# Patient Record
Sex: MALE | Race: BLACK OR AFRICAN AMERICAN | NOT HISPANIC OR LATINO | URBAN - METROPOLITAN AREA
[De-identification: names, ages, dates, MRNs, and addresses within clinical notes are randomized per-mention and may not be internally consistent; named-entity substitution may affect disease eponyms.]

---

## 2020-06-09 ENCOUNTER — OFFICE VISIT (OUTPATIENT)
Dept: URBAN - METROPOLITAN AREA TELEHEALTH 2 | Facility: TELEHEALTH | Age: 55
End: 2020-06-09
Payer: COMMERCIAL

## 2020-06-09 DIAGNOSIS — Z12.11 SCREENING COLONOSCOPY: ICD-10-CM

## 2020-06-09 PROCEDURE — 99202 OFFICE O/P NEW SF 15 MIN: CPT | Performed by: INTERNAL MEDICINE

## 2020-06-09 RX ORDER — AMLODIPINE BESYLATE 10 MG/1
1 TABLET TABLET ORAL ONCE A DAY
Qty: 30 | Status: ACTIVE | COMMUNITY
Start: 2020-06-09

## 2020-06-09 RX ORDER — POLYETHYLENE GLYCOL 3350 17 G/17G
AS DIRECTED POWDER, FOR SOLUTION ORAL ONCE A DAY
Qty: 238 GRAM | Refills: 0 | OUTPATIENT
Start: 2020-06-09

## 2020-06-09 RX ORDER — ATORVASTATIN CALCIUM 40 MG/1
1 TABLET TABLET, FILM COATED ORAL ONCE A DAY
Qty: 30 | Status: ACTIVE | COMMUNITY
Start: 2020-06-09

## 2020-06-09 RX ORDER — HYDROCHLOROTHIAZIDE 25 MG/1
1 TABLET IN THE MORNING TABLET ORAL ONCE A DAY
Qty: 30 | Status: ACTIVE | COMMUNITY
Start: 2020-06-09

## 2020-06-09 NOTE — HPI-TODAY'S VISIT:
55 yo patient presenting for evaluation for screening colon. He denies acute or chronic cardiopulmonary sxs. Denies significant medical hx. No fmhx of CRC but mother recently dx'd with gastric cancer. Attempts were made to use real-time two-way video technology for today's encounter. Due to a technological failure or access issues, telephone technology was used in lieu of an office visit due to the current COVID-19 pandemic crisis and need for social isolation.

## 2020-06-12 ENCOUNTER — OFFICE VISIT (OUTPATIENT)
Dept: URBAN - METROPOLITAN AREA CLINIC 15 | Facility: CLINIC | Age: 55
End: 2020-06-12

## 2021-01-11 ENCOUNTER — HOSPITAL ENCOUNTER (EMERGENCY)
Dept: HOSPITAL 5 - ED | Age: 56
Discharge: HOME | End: 2021-01-11
Payer: COMMERCIAL

## 2021-01-11 VITALS — SYSTOLIC BLOOD PRESSURE: 155 MMHG | DIASTOLIC BLOOD PRESSURE: 82 MMHG

## 2021-01-11 DIAGNOSIS — Z79.899: ICD-10-CM

## 2021-01-11 DIAGNOSIS — M19.012: Primary | ICD-10-CM

## 2021-01-11 PROCEDURE — 99283 EMERGENCY DEPT VISIT LOW MDM: CPT

## 2021-01-11 NOTE — XRAY REPORT
LEFT SHOULDER 3 VIEWS



INDICATION:  Left shoulder injury not able to lift. 



COMPARISON: None.



IMPRESSION:  No acute osseous or soft tissue abnormality.    Mild osteoarthritic changes are identifi
ed at the acromioclavicular joint and AC joint. There is also suggestion of a chronic healed fracture
 of the distal left clavicle.

 



Signer Name: Long Hart Jr, MD 

Signed: 1/11/2021 1:49 PM

Workstation Name: [id number]

## 2021-01-11 NOTE — EMERGENCY DEPARTMENT REPORT
Upper Extremity





- [facility]


Chief Complaint: Shoulder Injury


Stated Complaint: SHOULDER PAIN


Time Seen by Provider: 21 13:27


Upper Extremity: Left Shoulder


Occurred When: >5 Days


Symptoms: Yes Pain with Movement, Yes Limited Range of Movement, No Deformity, 

No Swelling, No Bruising/Ecchymosis


Other History: 55-year-old -American male with a past medical history of 

hypertension currently on hydrochlorothiazide and amlodipine presents to the 

emergency room complaining of left shoulder pain after lifting a heavy object 

last week.  Patient states that he has taken Advil last dose was last night.  

Patient states that it is more pain when he tries to elevate his left arm.





ED Review of Systems


ROS: 


Stated complaint: SHOULDER PAIN


Other details as noted in HPI








ED Past Medical Hx





- Past Medical History


Previous Medical History?: No





- Surgical History


Past Surgical History?: No





- Medications


Home Medications: 


                                Home Medications











 Medication  Instructions  Recorded  Confirmed  Last Taken  Type


 


Diclofenac Sodium 50 mg PO Q8H PRN #30 tablet. 21  Unknown Rx














Upper Extremity Exam





- Exam


General: 


Vital signs noted. No distress. Alert and acting appropriately.





Head and Torso: No HEENT Abnormality, No Neck Tenderness, No Chest/Lungs 

Abnormality, No Abdominal Tenderness, No Back Tenderness


Shoulder Exam: Yes Shoulder Tenderness, Yes AC Joint Tenderness, No Clavicle 

Tenderness, No Normal Range of Motion in Shoulder, No Shoulder Deformity


Arm Exam: No Arm/Humerus Tenderness, No Arm Deformity


Elbow: No Elbow Tenderness, No Normal Range of Motion in Elbow, No Elbow 

Deformity


Forearm: No Forearm Tenderness, No Forearm Deformity, No Pain with Pronation, No

Pain with Supination


Wrist: Yes Normal ROM in Wrist, No Wrist Tenderness, No Wrist Deformity, No 

Snuffbox Tenderness, No Pain with Axial Thumb Compression


Hand: Yes Normal ROM in Digit(s), No Hand Tenderness, No Hand Deformity, No 

Digit Tenderness, No Digit(s) Deformity, No Tendon Dysfunction


CMS Exam: No Broken Skin, No Normal Distal Pulses, No Normal Capillary Refill, 

No Normal Distal Sensation





ED Course


                                   Vital Signs











  21





  13:28


 


Temperature 98.2 F


 


Pulse Rate 68


 


Respiratory 16





Rate 


 


Blood Pressure 155/82





[Right] 


 


O2 Sat by Pulse 98





Oximetry 














ED Medical Decision Making





- Radiology Data


Radiology results: report reviewed





Patient: LUCY BAER MR#: [id number] 


 34 


 : 1965 Acct:[id number] 





 Age/Sex: 55 / M ADM Date: 21 





 Loc: ED 


 Attending Dr: 








 Ordering Physician: ANNY MARS 


 Date of Service: 21 


 Procedure(s): XR shoulder 2+V LT 


 Accession Number(s): [id number] 





 cc: ANNY MARS 





 Fluoro Time In Minutes: 





 LEFT SHOULDER 3 VIEWS 





INDICATION: Left shoulder injury not able to lift. 





COMPARISON: None. 





IMPRESSION: No acute osseous or soft tissue abnormality. Mild osteoarthritic 

changes are 


 identified at the acromioclavicular joint and AC joint. There is also 

suggestion of a chronic healed


 fracture of the distal left clavicle. 








Signer Name: Long Hart Jr, MD 


Signed: 2021 1:49 PM 


Workstation Name: [id number] 








 Transcribed By: TTR 


 Dictated By: LONG HART JR, MD 


 Electronically Authenticated By: LONG HART JR, MD 


 Signed Date/Time: 21 134 











 DD/DT: 218 


 TD/TT: 





- Medical Decision Making





55-year-old -American male with a past medical history of hypertension 

currently on hydrochlorothiazide and amlodipine presents to the emergency room 

complaining of left shoulder pain after lifting a heavy object last week.  

Patient states that he has taken Advil last dose was last night.  Patient states

 that it is more pain when he tries to elevate his left arm.








X-ray of left shoulder has been ordered.


Critical care attestation.: 


If time is entered above; I have spent that time in minutes in the direct care 

of this critically ill patient, excluding procedure time.








ED Disposition


Clinical Impression: 


 Left shoulder pain, Arthritis of shoulder region, degenerative





Disposition: DC-01 TO HOME OR SELFCARE


Is pt being admited?: No


Does the pt Need Aspirin: No


Condition: Stable


Instructions:  Shoulder Pain, Easy-to-Read, Arthritis, Easy-to-Read


Additional Instructions: 


X-ray is negative for any acute fractures.  It does show that you have some 

arthritis.  As well as a old clavicle fracture that is healed.  I recommended 

she follow-up with an orthopedic provider as she may need further studies.  You 

can take the pain medication be sure to increase your water intake while taking 

pain medication.


Prescriptions: 


Diclofenac Sodium 50 mg PO Q8H PRN #30 tablet.dr


 PRN Reason: Pain , Severe (7-10)


Referrals: 


LACEY GUTIERREZ MD [Staff Physician] - 3-5 Days


Forms:  Work/School Release Form(ED)

## 2022-07-21 ENCOUNTER — HOSPITAL ENCOUNTER (EMERGENCY)
Dept: HOSPITAL 5 - ED | Age: 57
Discharge: HOME | End: 2022-07-21
Payer: COMMERCIAL

## 2022-07-21 VITALS — SYSTOLIC BLOOD PRESSURE: 130 MMHG | DIASTOLIC BLOOD PRESSURE: 89 MMHG

## 2022-07-21 DIAGNOSIS — R06.02: Primary | ICD-10-CM

## 2022-07-21 DIAGNOSIS — F17.200: ICD-10-CM

## 2022-07-21 DIAGNOSIS — F12.90: ICD-10-CM

## 2022-07-21 DIAGNOSIS — I10: ICD-10-CM

## 2022-07-21 DIAGNOSIS — R07.9: ICD-10-CM

## 2022-07-21 PROCEDURE — 71046 X-RAY EXAM CHEST 2 VIEWS: CPT

## 2022-07-21 PROCEDURE — 99283 EMERGENCY DEPT VISIT LOW MDM: CPT

## 2022-07-21 NOTE — EMERGENCY DEPARTMENT REPORT
ED Shortness of Breath HPI





- General


Chief Complaint: Dyspnea/Respdistress


Stated Complaint: YVETTE


Time Seen by Provider: 07/21/22 07:28


Source: patient


Mode of arrival: Ambulatory


Limitations: No Limitations





- History of Present Illness


Initial Comments: 





56-year-old black male with a past medical history of hypertension presents to 

the emergency department for evaluation of sudden onset of shortness of breath. 

He states that when he woke up this morning he felt like he could not take a 

deep breath.  He states that whenever he would breathe he had pain in his lower 

back and it felt like a cramp.  He states that he also had pain in his abdominal

area that felt like bloating.  He states that pain is worse with movement and 

deep breaths.  He denies chest pain, fever, cough, nausea, vomiting, dizziness, 

and diaphoresis.


MD Complaint: shortness of breath


-: Sudden, hour(s)


Severity: moderate


Pain Scale: 7


Quality: aching, other (Cramping)


Consistency: intermittent


Worsens With: movement, coughing, inspiration


Associated Symptoms: pain with inspiration


Treatments Prior to Arrival: none





- Related Data


Home Oxygen Therapy: No


                                  Previous Rx's











 Medication  Instructions  Recorded  Last Taken  Type


 


Diclofenac Sodium 50 mg PO Q8H PRN #30 tablet. 01/11/21 Unknown Rx


 


Cyclobenzaprine [Flexeril] 10 mg PO TID PRN #30 tab 07/21/22 Unknown Rx


 


Naproxen [Naprosyn] 500 mg PO BID PRN #14 tab 07/21/22 Unknown Rx











                                    Allergies











Allergy/AdvReac Type Severity Reaction Status Date / Time


 


No Known Allergies Allergy   Unverified 01/11/21 13:25














ED Review of Systems


ROS: 


Stated complaint: YVETTE


Other details as noted in HPI





Comment: All other systems reviewed and negative


Constitutional: denies: chills, fever, weakness


Eyes: denies: vision change


ENT: denies: congestion


Respiratory: shortness of breath.  denies: cough, SOB with exertion, SOB at 

rest, stridor, wheezing


Cardiovascular: denies: chest pain, palpitations, dyspnea on exertion, 

orthopnea, edema, syncope, paroxysmal nocturnal dyspnea


Gastrointestinal: denies: abdominal pain, nausea, vomiting, diarrhea, 

hematemesis, melena, hematochezia


Genitourinary: denies: urgency, dysuria


Musculoskeletal: back pain


Neurological: denies: headache, weakness





ED Past Medical Hx





- Past Medical History


Previous Medical History?: Yes


Hx Hypertension: Yes





- Surgical History


Past Surgical History?: No





- Social History


Smoking Status: Current Every Day Smoker


Substance Use Type: Marijuana





- Medications


Home Medications: 


                                Home Medications











 Medication  Instructions  Recorded  Confirmed  Last Taken  Type


 


Diclofenac Sodium 50 mg PO Q8H PRN #30 tablet. 01/11/21  Unknown Rx


 


Cyclobenzaprine [Flexeril] 10 mg PO TID PRN #30 tab 07/21/22  Unknown Rx


 


Naproxen [Naprosyn] 500 mg PO BID PRN #14 tab 07/21/22  Unknown Rx














ED Physical Exam





- General


Limitations: No Limitations


General appearance: alert, in no apparent distress





- Head


Head exam: Present: atraumatic, normocephalic





- Eye


Eye exam: Present: normal appearance.  Absent: scleral icterus, conjunctival 

injection, periorbital swelling, periorbital tenderness





- Neck


Neck exam: Present: normal inspection, full ROM.  Absent: tenderness, 

lymphadenopathy





- Respiratory


Respiratory exam: Present: normal lung sounds bilaterally, chest wall tendernes

s.  Absent: respiratory distress, wheezes, rales, rhonchi, stridor





- Cardiovascular


Cardiovascular Exam: Present: regular rate, normal heart sounds





- GI/Abdominal


GI/Abdominal exam: Present: soft, normal bowel sounds.  Absent: distended, 

tenderness, guarding, rebound, rigid





- Extremities Exam


Extremities exam: Present: normal inspection, normal capillary refill.  Absent: 

pedal edema, joint swelling, calf tenderness





- Back Exam


Back exam: Present: normal inspection, tenderness (Bilateral lower).  Absent: 

CVA tenderness (R), CVA tenderness (L), vertebral tenderness





- Neurological Exam


Neurological exam: Present: alert, oriented X3, CN II-XII intact, normal gait





- Psychiatric


Psychiatric exam: Present: normal affect, normal mood





- Skin


Skin exam: Present: warm, dry, intact, normal color





ED Course


                                   Vital Signs











  07/21/22 07/21/22





  05:39 08:20


 


Temperature 98.2 F 98.0 F


 


Pulse Rate 74 66


 


Respiratory 20 20





Rate  


 


Blood Pressure 142/91 


 


Blood Pressure  130/89





[Left]  


 


O2 Sat by Pulse 96 96





Oximetry  














ED Medical Decision Making





- Radiology Data


Radiology results: report reviewed, image reviewed





Chest x-ray:


 FINDINGS:  


 


 SUPPORT DEVICES: None.  


 HEART / MEDIASTINUM: No significant abnormality.   


 LUNGS / PLEURA: No significant pulmonary or pleural abnormality. No 

pneumothorax.   


 


 ADDITIONAL FINDINGS: No significant additional findings.  


 


 IMPRESSION:  


 1. No acute findings. 





- Medical Decision Making





56-year-old black male with a past medical history of hypertension presents to 

the emergency department for evaluation of sudden onset of shortness of breath. 

 He states that when he woke up this morning he felt like he could not take a 

deep breath.  He states that whenever he would breathe he had pain in his lower 

back and it felt like a cramp.  He states that he also had pain in his abdominal

 area that felt like bloating.  He states that pain is worse with movement and 

deep breaths.  He denies chest pain, fever, cough, nausea, vomiting, dizziness, 

and diaphoresis.





Physical exam notable for chest wall tenderness and tenderness to lower back 

with palpation.  Chest x-ray without any acute abnormalities noted.  Patient 

will be discharged home with 7-day course of naproxen along with Flexeril and 

advised to follow-up with his primary care provider if no improvement or 

worsening symptoms.  He is advised to return to the emergency department for any

 concerning symptoms.  He verbalizes understanding of and agreement with plan of

 care.


Critical care attestation.: 


If time is entered above; I have spent that time in minutes in the direct care 

of this critically ill patient, excluding procedure time.








ED Disposition


Clinical Impression: 


 Shortness of breath, Chest wall tenderness





Lower back pain


Qualifiers:


 Chronicity: acute Back pain laterality: bilateral Sciatica presence: without 

sciatica Qualified Code(s): M54.50 - Low back pain, unspecified





Disposition: 01 HOME / SELF CARE / HOMELESS


Is pt being admited?: No


Does the pt Need Aspirin: No


Condition: Stable


Instructions:  Shortness of Breath, Adult, Easy-to-Read, Chest Wall Pain, 

Easy-to-Read, Low Back Sprain or Strain Rehab-SportsMed


Additional Instructions: 


Take medications as prescribed.  Follow-up with your primary care provider if no

 improvement or worsening symptoms.  Return to the emergency department for any 

concerning symptoms.


Prescriptions: 


Cyclobenzaprine [Flexeril] 10 mg PO TID PRN #30 tab


 PRN Reason: Muscle Spasm


Naproxen [Naprosyn] 500 mg PO BID PRN #14 tab


 PRN Reason: pain


Referrals: 


SORAYA DIAZ MD [Staff Physician] - 3-5 Days


Forms:  Work/School Release Form(ED)


Time of Disposition: 09:05

## 2022-07-21 NOTE — XRAY REPORT
CHEST 2 VIEWS 



INDICATION / CLINICAL INFORMATION: sob.



COMPARISON: None available



FINDINGS:



SUPPORT DEVICES: None.

HEART / MEDIASTINUM: No significant abnormality. 

LUNGS / PLEURA: No significant pulmonary or pleural abnormality. No pneumothorax. 



ADDITIONAL FINDINGS: No significant additional findings.



IMPRESSION:

1. No acute findings.



Signer Name: Bubba Rogers DO 

Signed: 7/21/2022 8:28 AM

Workstation Name: [id number]

## 2023-05-22 ENCOUNTER — LAB OUTSIDE AN ENCOUNTER (OUTPATIENT)
Dept: URBAN - METROPOLITAN AREA CLINIC 118 | Facility: CLINIC | Age: 58
End: 2023-05-22

## 2023-05-22 ENCOUNTER — CLAIMS CREATED FROM THE CLAIM WINDOW (OUTPATIENT)
Dept: URBAN - METROPOLITAN AREA CLINIC 118 | Facility: CLINIC | Age: 58
End: 2023-05-22
Payer: COMMERCIAL

## 2023-05-22 ENCOUNTER — DASHBOARD ENCOUNTERS (OUTPATIENT)
Age: 58
End: 2023-05-22

## 2023-05-22 VITALS
HEIGHT: 71 IN | BODY MASS INDEX: 24.92 KG/M2 | TEMPERATURE: 97.8 F | WEIGHT: 178 LBS | DIASTOLIC BLOOD PRESSURE: 84 MMHG | SYSTOLIC BLOOD PRESSURE: 142 MMHG | HEART RATE: 57 BPM

## 2023-05-22 DIAGNOSIS — Z12.11 SCREENING FOR COLON CANCER: ICD-10-CM

## 2023-05-22 PROCEDURE — 99202 OFFICE O/P NEW SF 15 MIN: CPT

## 2023-05-22 PROCEDURE — 99202 OFFICE O/P NEW SF 15 MIN: CPT | Performed by: INTERNAL MEDICINE

## 2023-05-22 PROCEDURE — 99212 OFFICE O/P EST SF 10 MIN: CPT | Performed by: INTERNAL MEDICINE

## 2023-05-22 RX ORDER — AMLODIPINE BESYLATE 10 MG/1
1 TABLET TABLET ORAL ONCE A DAY
Qty: 30 | Status: ACTIVE | COMMUNITY
Start: 2020-06-09

## 2023-05-22 RX ORDER — POLYETHYLENE GLYCOL 3350 17 G/17G
AS DIRECTED POWDER, FOR SOLUTION ORAL ONCE A DAY
Qty: 238 GRAM | Refills: 0 | Status: ON HOLD | COMMUNITY
Start: 2020-06-09

## 2023-05-22 RX ORDER — HYDROCHLOROTHIAZIDE 25 MG/1
1 TABLET IN THE MORNING TABLET ORAL ONCE A DAY
Qty: 30 | Status: ACTIVE | COMMUNITY
Start: 2020-06-09

## 2023-05-22 RX ORDER — ATORVASTATIN CALCIUM 40 MG/1
1 TABLET TABLET, FILM COATED ORAL ONCE A DAY
Qty: 30 | Status: ACTIVE | COMMUNITY
Start: 2020-06-09

## 2023-05-22 NOTE — HPI-TODAY'S VISIT:
Mr. Shaw is a 56 y/o AAM who presents today for colonoscopy consultation. Patient was referred by Dr. Harpal Rollins and a copy of this documentation will be sent to the referring provider.  No prior colon. No known FH of CRC, but mother w hx of gastric cancer. Denies any GI bleeding, unintentional weight loss, NV, changes in bowel habits or abdominal pain. Has 2-3 formed stools daily. No upper GI symptoms at present.

## 2023-05-30 ENCOUNTER — OFFICE VISIT (OUTPATIENT)
Dept: URBAN - METROPOLITAN AREA SURGERY CENTER 23 | Facility: SURGERY CENTER | Age: 58
End: 2023-05-30
Payer: COMMERCIAL

## 2023-05-30 DIAGNOSIS — Z12.11 COLON CANCER SCREENING: ICD-10-CM

## 2023-05-30 PROCEDURE — G8907 PT DOC NO EVENTS ON DISCHARG: HCPCS | Performed by: INTERNAL MEDICINE

## 2023-05-30 PROCEDURE — 45378 DIAGNOSTIC COLONOSCOPY: CPT | Performed by: INTERNAL MEDICINE

## 2023-05-30 RX ORDER — POLYETHYLENE GLYCOL 3350 17 G/17G
AS DIRECTED POWDER, FOR SOLUTION ORAL ONCE A DAY
Qty: 238 GRAM | Refills: 0 | Status: ON HOLD | COMMUNITY
Start: 2020-06-09

## 2023-05-30 RX ORDER — AMLODIPINE BESYLATE 10 MG/1
1 TABLET TABLET ORAL ONCE A DAY
Qty: 30 | Status: ACTIVE | COMMUNITY
Start: 2020-06-09

## 2023-05-30 RX ORDER — ATORVASTATIN CALCIUM 40 MG/1
1 TABLET TABLET, FILM COATED ORAL ONCE A DAY
Qty: 30 | Status: ACTIVE | COMMUNITY
Start: 2020-06-09

## 2023-05-30 RX ORDER — HYDROCHLOROTHIAZIDE 25 MG/1
1 TABLET IN THE MORNING TABLET ORAL ONCE A DAY
Qty: 30 | Status: ACTIVE | COMMUNITY
Start: 2020-06-09